# Patient Record
Sex: FEMALE | Race: WHITE | Employment: UNEMPLOYED | URBAN - NONMETROPOLITAN AREA
[De-identification: names, ages, dates, MRNs, and addresses within clinical notes are randomized per-mention and may not be internally consistent; named-entity substitution may affect disease eponyms.]

---
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
## 2023-09-24 ENCOUNTER — HOSPITAL ENCOUNTER (EMERGENCY)
Age: 18
Discharge: HOME | End: 2023-09-24
Attending: STUDENT IN AN ORGANIZED HEALTH CARE EDUCATION/TRAINING PROGRAM
Payer: COMMERCIAL

## 2023-09-24 VITALS
SYSTOLIC BLOOD PRESSURE: 137 MMHG | OXYGEN SATURATION: 100 % | RESPIRATION RATE: 23 BRPM | HEIGHT: 63 IN | DIASTOLIC BLOOD PRESSURE: 53 MMHG | TEMPERATURE: 98.3 F | HEART RATE: 114 BPM

## 2023-09-24 DIAGNOSIS — R11.2 NAUSEA AND VOMITING, UNSPECIFIED VOMITING TYPE: Primary | ICD-10-CM

## 2023-09-24 DIAGNOSIS — R10.13 ABDOMINAL PAIN, EPIGASTRIC: ICD-10-CM

## 2023-09-24 LAB
ALBUMIN SERPL BCG-MCNC: 4.4 G/DL (ref 3.5–5.1)
ALP SERPL-CCNC: 102 U/L (ref 38–126)
ALT SERPL W/O P-5'-P-CCNC: 13 U/L (ref 11–66)
ANION GAP SERPL CALC-SCNC: 11 MEQ/L (ref 8–16)
AST SERPL-CCNC: 15 U/L (ref 5–40)
B-HCG SERPL QL: NEGATIVE
BASOPHILS ABSOLUTE: 0 THOU/MM3 (ref 0–0.1)
BASOPHILS NFR BLD AUTO: 0.3 %
BILIRUB SERPL-MCNC: 0.3 MG/DL (ref 0.3–1.2)
BILIRUB UR QL STRIP.AUTO: NEGATIVE
BUN SERPL-MCNC: 8 MG/DL (ref 7–22)
CALCIUM SERPL-MCNC: 9.2 MG/DL (ref 8.5–10.5)
CHARACTER UR: CLEAR
CHLORIDE SERPL-SCNC: 103 MEQ/L (ref 98–111)
CO2 SERPL-SCNC: 25 MEQ/L (ref 23–33)
COLOR: YELLOW
CREAT SERPL-MCNC: 0.6 MG/DL (ref 0.4–1.2)
DEPRECATED RDW RBC AUTO: 40.4 FL (ref 35–45)
EOSINOPHIL NFR BLD AUTO: 0.5 %
EOSINOPHILS ABSOLUTE: 0.1 THOU/MM3 (ref 0–0.4)
ERYTHROCYTE [DISTWIDTH] IN BLOOD BY AUTOMATED COUNT: 13.3 % (ref 11.5–14.5)
FLUAV RNA RESP QL NAA+PROBE: NOT DETECTED
FLUBV RNA RESP QL NAA+PROBE: NOT DETECTED
GFR SERPL CREATININE-BSD FRML MDRD: > 60 ML/MIN/1.73M2
GLUCOSE SERPL-MCNC: 96 MG/DL (ref 70–108)
GLUCOSE UR QL STRIP.AUTO: NEGATIVE MG/DL
HCT VFR BLD AUTO: 44.4 % (ref 37–47)
HGB BLD-MCNC: 13.9 GM/DL (ref 12–16)
HGB UR QL STRIP.AUTO: NEGATIVE
IMM GRANULOCYTES # BLD AUTO: 0.05 THOU/MM3 (ref 0–0.07)
IMM GRANULOCYTES NFR BLD AUTO: 0.4 %
KETONES UR QL STRIP.AUTO: NEGATIVE
LACTIC ACID, SEPSIS: 1.8 MMOL/L (ref 0.5–1.9)
LIPASE SERPL-CCNC: 19 U/L (ref 5.6–51.3)
LYMPHOCYTES ABSOLUTE: 0.8 THOU/MM3 (ref 1–4.8)
LYMPHOCYTES NFR BLD AUTO: 5.9 %
MCH RBC QN AUTO: 26.2 PG (ref 26–33)
MCHC RBC AUTO-ENTMCNC: 31.3 GM/DL (ref 32.2–35.5)
MCV RBC AUTO: 83.6 FL (ref 81–99)
MONOCYTES ABSOLUTE: 0.9 THOU/MM3 (ref 0.4–1.3)
MONOCYTES NFR BLD AUTO: 6.4 %
NEUTROPHILS NFR BLD AUTO: 86.5 %
NITRITE UR QL STRIP: NEGATIVE
NRBC BLD AUTO-RTO: 0 /100 WBC
OSMOLALITY SERPL CALC.SUM OF ELEC: 275.7 MOSMOL/KG (ref 275–300)
PH UR STRIP.AUTO: 5.5 [PH] (ref 5–9)
PLATELET # BLD AUTO: 275 THOU/MM3 (ref 130–400)
PMV BLD AUTO: 10.6 FL (ref 9.4–12.4)
POTASSIUM SERPL-SCNC: 3.6 MEQ/L (ref 3.5–5.2)
PROT SERPL-MCNC: 6.7 G/DL (ref 6.1–8)
PROT UR STRIP.AUTO-MCNC: NEGATIVE MG/DL
RBC # BLD AUTO: 5.31 MILL/MM3 (ref 4.2–5.4)
SARS-COV-2 RNA RESP QL NAA+PROBE: NOT DETECTED
SEGMENTED NEUTROPHILS ABSOLUTE COUNT: 11.6 THOU/MM3 (ref 1.8–7.7)
SODIUM SERPL-SCNC: 139 MEQ/L (ref 135–145)
SP GR UR REFRACT.AUTO: 1.02 (ref 1–1.03)
UROBILINOGEN, URINE: 0.2 EU/DL (ref 0–1)
WBC # BLD AUTO: 13.4 THOU/MM3 (ref 4.8–10.8)
WBC #/AREA URNS HPF: NEGATIVE /[HPF]

## 2023-09-24 PROCEDURE — 87636 SARSCOV2 & INF A&B AMP PRB: CPT

## 2023-09-24 PROCEDURE — 83690 ASSAY OF LIPASE: CPT

## 2023-09-24 PROCEDURE — 36415 COLL VENOUS BLD VENIPUNCTURE: CPT

## 2023-09-24 PROCEDURE — 81003 URINALYSIS AUTO W/O SCOPE: CPT

## 2023-09-24 PROCEDURE — 99284 EMERGENCY DEPT VISIT MOD MDM: CPT

## 2023-09-24 PROCEDURE — 96374 THER/PROPH/DIAG INJ IV PUSH: CPT

## 2023-09-24 PROCEDURE — 6370000000 HC RX 637 (ALT 250 FOR IP): Performed by: STUDENT IN AN ORGANIZED HEALTH CARE EDUCATION/TRAINING PROGRAM

## 2023-09-24 PROCEDURE — 6360000002 HC RX W HCPCS: Performed by: STUDENT IN AN ORGANIZED HEALTH CARE EDUCATION/TRAINING PROGRAM

## 2023-09-24 PROCEDURE — 85025 COMPLETE CBC W/AUTO DIFF WBC: CPT

## 2023-09-24 PROCEDURE — 80053 COMPREHEN METABOLIC PANEL: CPT

## 2023-09-24 PROCEDURE — 2580000003 HC RX 258: Performed by: STUDENT IN AN ORGANIZED HEALTH CARE EDUCATION/TRAINING PROGRAM

## 2023-09-24 PROCEDURE — 83605 ASSAY OF LACTIC ACID: CPT

## 2023-09-24 PROCEDURE — 84703 CHORIONIC GONADOTROPIN ASSAY: CPT

## 2023-09-24 PROCEDURE — 96375 TX/PRO/DX INJ NEW DRUG ADDON: CPT

## 2023-09-24 RX ORDER — ACETAMINOPHEN 500 MG
1000 TABLET ORAL ONCE
Status: COMPLETED | OUTPATIENT
Start: 2023-09-24 | End: 2023-09-24

## 2023-09-24 RX ORDER — ONDANSETRON 4 MG/1
4 TABLET, ORALLY DISINTEGRATING ORAL 3 TIMES DAILY PRN
Qty: 9 TABLET | Refills: 0 | Status: SHIPPED | OUTPATIENT
Start: 2023-09-24 | End: 2023-10-03

## 2023-09-24 RX ORDER — MORPHINE SULFATE 2 MG/ML
2 INJECTION, SOLUTION INTRAMUSCULAR; INTRAVENOUS ONCE
Status: COMPLETED | OUTPATIENT
Start: 2023-09-24 | End: 2023-09-24

## 2023-09-24 RX ORDER — ONDANSETRON 2 MG/ML
4 INJECTION INTRAMUSCULAR; INTRAVENOUS ONCE
Status: COMPLETED | OUTPATIENT
Start: 2023-09-24 | End: 2023-09-24

## 2023-09-24 RX ORDER — SODIUM CHLORIDE, SODIUM LACTATE, POTASSIUM CHLORIDE, AND CALCIUM CHLORIDE .6; .31; .03; .02 G/100ML; G/100ML; G/100ML; G/100ML
1000 INJECTION, SOLUTION INTRAVENOUS ONCE
Status: COMPLETED | OUTPATIENT
Start: 2023-09-24 | End: 2023-09-24

## 2023-09-24 RX ORDER — 0.9 % SODIUM CHLORIDE 0.9 %
1000 INTRAVENOUS SOLUTION INTRAVENOUS ONCE
Status: COMPLETED | OUTPATIENT
Start: 2023-09-24 | End: 2023-09-24

## 2023-09-24 RX ADMIN — MORPHINE SULFATE 2 MG: 2 INJECTION, SOLUTION INTRAMUSCULAR; INTRAVENOUS at 01:26

## 2023-09-24 RX ADMIN — SODIUM CHLORIDE 1000 ML: 9 INJECTION, SOLUTION INTRAVENOUS at 01:30

## 2023-09-24 RX ADMIN — SODIUM CHLORIDE, POTASSIUM CHLORIDE, SODIUM LACTATE AND CALCIUM CHLORIDE 1000 ML: 600; 310; 30; 20 INJECTION, SOLUTION INTRAVENOUS at 03:34

## 2023-09-24 RX ADMIN — ONDANSETRON 4 MG: 2 INJECTION INTRAMUSCULAR; INTRAVENOUS at 01:25

## 2023-09-24 RX ADMIN — ACETAMINOPHEN 1000 MG: 500 TABLET ORAL at 03:04

## 2023-09-24 ASSESSMENT — PAIN SCALES - GENERAL
PAINLEVEL_OUTOF10: 7
PAINLEVEL_OUTOF10: 6
PAINLEVEL_OUTOF10: 7

## 2023-09-24 ASSESSMENT — PAIN DESCRIPTION - LOCATION
LOCATION: ABDOMEN

## 2023-09-24 ASSESSMENT — PAIN - FUNCTIONAL ASSESSMENT: PAIN_FUNCTIONAL_ASSESSMENT: 0-10

## 2023-09-24 NOTE — ED NOTES
Patient resting in bed. Respirations easy and unlabored. No distress noted. Call light within reach.       Gautam Shankar, JOSE  09/24/23 [id number]

## 2023-09-24 NOTE — ED NOTES
Urine sample collected and sent to lab at this time. Patient resting in bed. Respirations easy and unlabored. No distress noted. Call light within reach.       Gautam Shankar, JOSE  09/24/23 [id number]

## 2023-09-24 NOTE — ED NOTES
Patient resting in bed. Respirations easy and unlabored. No distress noted. Call light within reach.       Richard Karimi RN  09/24/23 [id number]

## 2023-09-24 NOTE — DISCHARGE INSTRUCTIONS
You are seen tonight for abdominal pain, nausea, vomiting. Your lab work was reassuring you did not need imaging at this time. Call family medicine clinic to establish primary care. Take an instrument as needed for nausea. If your symptoms are worse or other new concerns come please correct the emergency room ED for reevaluation.

## 2023-09-24 NOTE — ED TRIAGE NOTES
Patient presents to ED with chief complaint of abdominal pain and emesis that started Friday. Patient rates pain 7/10 and states she thinks she might have food poisoning due to eating bad meatballs. Patient resting in bed. Respirations easy and unlabored. No distress noted. Call light within reach.

## 2024-02-19 ENCOUNTER — OFFICE VISIT (OUTPATIENT)
Dept: FAMILY MEDICINE CLINIC | Age: 19
End: 2024-02-19
Payer: COMMERCIAL

## 2024-02-19 VITALS
RESPIRATION RATE: 21 BRPM | BODY MASS INDEX: 43.27 KG/M2 | HEART RATE: 88 BPM | HEIGHT: 63 IN | SYSTOLIC BLOOD PRESSURE: 142 MMHG | TEMPERATURE: 98.3 F | OXYGEN SATURATION: 99 % | DIASTOLIC BLOOD PRESSURE: 108 MMHG | WEIGHT: 244.2 LBS

## 2024-02-19 DIAGNOSIS — E66.01 MORBID OBESITY (HCC): Primary | ICD-10-CM

## 2024-02-19 DIAGNOSIS — R03.0 ELEVATED BLOOD PRESSURE READING: ICD-10-CM

## 2024-02-19 DIAGNOSIS — R00.0 TACHYCARDIA: ICD-10-CM

## 2024-02-19 DIAGNOSIS — Z11.59 NEED FOR HEPATITIS C SCREENING TEST: ICD-10-CM

## 2024-02-19 DIAGNOSIS — Z11.4 ENCOUNTER FOR SCREENING FOR HIV: ICD-10-CM

## 2024-02-19 PROCEDURE — G8484 FLU IMMUNIZE NO ADMIN: HCPCS

## 2024-02-19 PROCEDURE — G8417 CALC BMI ABV UP PARAM F/U: HCPCS

## 2024-02-19 PROCEDURE — 99204 OFFICE O/P NEW MOD 45 MIN: CPT

## 2024-02-19 PROCEDURE — 1036F TOBACCO NON-USER: CPT

## 2024-02-19 PROCEDURE — G8427 DOCREV CUR MEDS BY ELIG CLIN: HCPCS

## 2024-02-19 RX ORDER — BLOOD PRESSURE TEST KIT
1 KIT MISCELLANEOUS ONCE
Qty: 1 KIT | Refills: 0 | Status: SHIPPED | OUTPATIENT
Start: 2024-02-19 | End: 2024-02-19

## 2024-02-19 SDOH — ECONOMIC STABILITY: FOOD INSECURITY: WITHIN THE PAST 12 MONTHS, THE FOOD YOU BOUGHT JUST DIDN'T LAST AND YOU DIDN'T HAVE MONEY TO GET MORE.: NEVER TRUE

## 2024-02-19 SDOH — ECONOMIC STABILITY: HOUSING INSECURITY
IN THE LAST 12 MONTHS, WAS THERE A TIME WHEN YOU DID NOT HAVE A STEADY PLACE TO SLEEP OR SLEPT IN A SHELTER (INCLUDING NOW)?: NO

## 2024-02-19 SDOH — ECONOMIC STABILITY: FOOD INSECURITY: WITHIN THE PAST 12 MONTHS, YOU WORRIED THAT YOUR FOOD WOULD RUN OUT BEFORE YOU GOT MONEY TO BUY MORE.: NEVER TRUE

## 2024-02-19 SDOH — ECONOMIC STABILITY: INCOME INSECURITY: HOW HARD IS IT FOR YOU TO PAY FOR THE VERY BASICS LIKE FOOD, HOUSING, MEDICAL CARE, AND HEATING?: NOT HARD AT ALL

## 2024-02-19 ASSESSMENT — ENCOUNTER SYMPTOMS
COUGH: 0
CHEST TIGHTNESS: 0
ABDOMINAL PAIN: 0
SHORTNESS OF BREATH: 0
SINUS PAIN: 0
BACK PAIN: 0
SINUS PRESSURE: 0
CHOKING: 0
DIARRHEA: 0
SORE THROAT: 0

## 2024-02-19 ASSESSMENT — PATIENT HEALTH QUESTIONNAIRE - PHQ9
SUM OF ALL RESPONSES TO PHQ QUESTIONS 1-9: 1
SUM OF ALL RESPONSES TO PHQ9 QUESTIONS 1 & 2: 1
2. FEELING DOWN, DEPRESSED OR HOPELESS: 0
1. LITTLE INTEREST OR PLEASURE IN DOING THINGS: 1
SUM OF ALL RESPONSES TO PHQ QUESTIONS 1-9: 1

## 2024-02-19 NOTE — PROGRESS NOTES
S: 19 y.o. female with   Chief Complaint   Patient presents with    New Patient     Lost about 50lbs while pregnant, recently gained it all back. Jan 2023 gave birth.       HPI: please see resident note for HPI and ROS.    BP Readings from Last 3 Encounters:   02/19/24 (!) 142/108   09/24/23 (!) 137/53   04/02/22 138/88     Wt Readings from Last 3 Encounters:   02/19/24 110.8 kg (244 lb 3.2 oz)       O: VS:  height is 1.6 m (5' 3\") and weight is 110.8 kg (244 lb 3.2 oz). Her oral temperature is 98.3 °F (36.8 °C). Her blood pressure is 142/108 (abnormal) and her pulse is 88. Her respiration is 21 and oxygen saturation is 99%.   AAO/NAD, appropriate affect for mood  CV:  RRR, no murmur  Resp: CTAB     Diagnosis Orders   1. Morbid obesity (HCC)  Lipid Panel    CBC with Auto Differential    Comprehensive Metabolic Panel, Fasting    Hemoglobin A1C    TSH With Reflex Ft4    [facility] - Dietitian      2. Elevated blood pressure reading  CBC with Auto Differential    Comprehensive Metabolic Panel, Fasting    TSH With Reflex Ft4    Blood Pressure KIT      3. Tachycardia        4. Encounter for screening for HIV  HIV Screen      5. Need for hepatitis C screening test  Hepatitis C Antibody          Plan:  Please refer to resident note for full plan.    19 year old female presents to the office to establish care.    Patient has a history of tachycardia although not tachycardic today on exam.  Patient states she has been told this by other providers but nothing is ever done about it.  We will plan to obtain fasting blood work and follow-up for review.  No obvious concerns on exam today.  Monitor pulse at home.    Obesity: Chronic, uncontrolled.  BMI today 43.26.  Will plan to follow-up with dietitian to assist with lifestyle changes.  Will also plan to get blood work and see if patient qualifies for medication assisted therapy on follow-up.    Elevated blood pressure reading.  Patient elevated blood 
mass.      Tenderness: There is no abdominal tenderness.      Hernia: No hernia is present.   Musculoskeletal:         General: Normal range of motion.   Skin:     General: Skin is warm and dry.      Capillary Refill: Capillary refill takes less than 2 seconds.   Neurological:      General: No focal deficit present.      Mental Status: She is alert and oriented to person, place, and time. Mental status is at baseline.   Psychiatric:         Mood and Affect: Mood normal.         Behavior: Behavior normal.         Thought Content: Thought content normal.         There is no immunization history on file for this patient.    Health Maintenance Due   Topic Date Due    Hepatitis B vaccine (1 of 3 - 3-dose series) Never done    COVID-19 Vaccine (1) Never done    Varicella vaccine (1 of 2 - 2-dose childhood series) Never done    HPV vaccine (1 - 2-dose series) Never done    HIV screen  Never done    Chlamydia/GC screen  Never done    Hepatitis C screen  Never done    Flu vaccine (1) Never done    Pap smear  Never done       Food Insecurity: No Food Insecurity (2/19/2024)    Hunger Vital Sign     Worried About Running Out of Food in the Last Year: Never true     Ran Out of Food in the Last Year: Never true       Assessment / Plan:   1. Elevated blood pressure reading  Elevated BP, will order blood pressure cuff, check once daily, bring BP cuff at next visit to see if it is calibrated, DASH diet, aerobic exercise daily 30 minutes.    - CBC with Auto Differential; Future  - Comprehensive Metabolic Panel, Fasting; Future  - TSH With Reflex Ft4; Future  - Blood Pressure KIT; 1 Units by Does not apply route once for 1 dose  Dispense: 1 kit; Refill: 0    2. Tachycardia  History of tachycardia during office visits, patient is asymptomatic, not tachycardic during office visit, monitor with pulse oximetry at home, will obtain a CBC, CMP, TSH.    3. Morbid obesity (HCC)  Morbid obesity, BMI 43.26, encourage healthy lifestyle

## 2024-02-22 ENCOUNTER — NURSE ONLY (OUTPATIENT)
Dept: LAB | Age: 19
End: 2024-02-22

## 2024-02-22 DIAGNOSIS — E66.01 MORBID OBESITY (HCC): ICD-10-CM

## 2024-02-22 DIAGNOSIS — R03.0 ELEVATED BLOOD PRESSURE READING: ICD-10-CM

## 2024-02-22 DIAGNOSIS — Z11.59 NEED FOR HEPATITIS C SCREENING TEST: ICD-10-CM

## 2024-02-22 DIAGNOSIS — Z11.4 ENCOUNTER FOR SCREENING FOR HIV: ICD-10-CM

## 2024-02-22 LAB
ALBUMIN SERPL BCG-MCNC: 4.7 G/DL (ref 3.5–5.1)
ALP SERPL-CCNC: 105 U/L (ref 38–126)
ALT SERPL W/O P-5'-P-CCNC: 13 U/L (ref 11–66)
ANION GAP SERPL CALC-SCNC: 16 MEQ/L (ref 8–16)
AST SERPL-CCNC: 16 U/L (ref 5–40)
BASOPHILS ABSOLUTE: 0 THOU/MM3 (ref 0–0.1)
BASOPHILS NFR BLD AUTO: 0.3 %
BILIRUB SERPL-MCNC: 0.4 MG/DL (ref 0.3–1.2)
BUN SERPL-MCNC: 14 MG/DL (ref 7–22)
CALCIUM SERPL-MCNC: 9.9 MG/DL (ref 8.5–10.5)
CHLORIDE SERPL-SCNC: 101 MEQ/L (ref 98–111)
CHOLEST SERPL-MCNC: 176 MG/DL (ref 100–199)
CO2 SERPL-SCNC: 23 MEQ/L (ref 23–33)
CREAT SERPL-MCNC: 0.6 MG/DL (ref 0.4–1.2)
DEPRECATED MEAN GLUCOSE BLD GHB EST-ACNC: 99 MG/DL (ref 70–126)
DEPRECATED RDW RBC AUTO: 39 FL (ref 35–45)
EOSINOPHIL NFR BLD AUTO: 0.9 %
EOSINOPHILS ABSOLUTE: 0.1 THOU/MM3 (ref 0–0.4)
ERYTHROCYTE [DISTWIDTH] IN BLOOD BY AUTOMATED COUNT: 13.2 % (ref 11.5–14.5)
GFR SERPL CREATININE-BSD FRML MDRD: > 60 ML/MIN/1.73M2
GLUCOSE FASTING: 89 MG/DL (ref 70–108)
HBA1C MFR BLD HPLC: 5.3 % (ref 4.4–6.4)
HCT VFR BLD AUTO: 40.3 % (ref 37–47)
HCV IGG SERPL QL IA: NEGATIVE
HDLC SERPL-MCNC: 36 MG/DL
HGB BLD-MCNC: 12.6 GM/DL (ref 12–16)
IMM GRANULOCYTES # BLD AUTO: 0.02 THOU/MM3 (ref 0–0.07)
IMM GRANULOCYTES NFR BLD AUTO: 0.2 %
LDLC SERPL CALC-MCNC: 124 MG/DL
LYMPHOCYTES ABSOLUTE: 2.7 THOU/MM3 (ref 1–4.8)
LYMPHOCYTES NFR BLD AUTO: 31.9 %
MCH RBC QN AUTO: 25.6 PG (ref 26–33)
MCHC RBC AUTO-ENTMCNC: 31.3 GM/DL (ref 32.2–35.5)
MCV RBC AUTO: 81.7 FL (ref 81–99)
MONOCYTES ABSOLUTE: 0.6 THOU/MM3 (ref 0.4–1.3)
MONOCYTES NFR BLD AUTO: 6.4 %
NEUTROPHILS NFR BLD AUTO: 60.3 %
NRBC BLD AUTO-RTO: 0 /100 WBC
PLATELET # BLD AUTO: 318 THOU/MM3 (ref 130–400)
PMV BLD AUTO: 11 FL (ref 9.4–12.4)
POTASSIUM SERPL-SCNC: 3.8 MEQ/L (ref 3.5–5.2)
PROT SERPL-MCNC: 7.8 G/DL (ref 6.1–8)
RBC # BLD AUTO: 4.93 MILL/MM3 (ref 4.2–5.4)
SEGMENTED NEUTROPHILS ABSOLUTE COUNT: 5.2 THOU/MM3 (ref 1.8–7.7)
SODIUM SERPL-SCNC: 140 MEQ/L (ref 135–145)
TRIGL SERPL-MCNC: 78 MG/DL (ref 0–199)
TSH SERPL DL<=0.005 MIU/L-ACNC: 1.59 UIU/ML (ref 0.4–4.2)
WBC # BLD AUTO: 8.6 THOU/MM3 (ref 4.8–10.8)

## 2024-02-23 ENCOUNTER — TELEPHONE (OUTPATIENT)
Dept: FAMILY MEDICINE CLINIC | Age: 19
End: 2024-02-23

## 2024-02-23 LAB — HIV 1+2 AB+HIV1 P24 AG SERPL QL IA: NORMAL

## 2024-02-23 NOTE — TELEPHONE ENCOUNTER
----- Message from Devaughn Barrera MD sent at 2/23/2024  7:50 AM EST -----  Decrease HDL cholesterol, increase fiber intake, increase aerobic exercise.  All other labs look normal.

## 2025-02-03 ENCOUNTER — OFFICE VISIT (OUTPATIENT)
Dept: FAMILY MEDICINE CLINIC | Age: 20
End: 2025-02-03
Payer: COMMERCIAL

## 2025-02-03 VITALS
RESPIRATION RATE: 16 BRPM | TEMPERATURE: 98.9 F | WEIGHT: 250.4 LBS | BODY MASS INDEX: 44.37 KG/M2 | HEIGHT: 63 IN | SYSTOLIC BLOOD PRESSURE: 134 MMHG | DIASTOLIC BLOOD PRESSURE: 84 MMHG | HEART RATE: 104 BPM

## 2025-02-03 DIAGNOSIS — R00.0 TACHYCARDIA: ICD-10-CM

## 2025-02-03 DIAGNOSIS — E66.01 CLASS 3 SEVERE OBESITY DUE TO EXCESS CALORIES WITHOUT SERIOUS COMORBIDITY WITH BODY MASS INDEX (BMI) OF 40.0 TO 44.9 IN ADULT: Primary | ICD-10-CM

## 2025-02-03 DIAGNOSIS — E78.00 ELEVATED LDL CHOLESTEROL LEVEL: ICD-10-CM

## 2025-02-03 DIAGNOSIS — E66.813 CLASS 3 SEVERE OBESITY DUE TO EXCESS CALORIES WITHOUT SERIOUS COMORBIDITY WITH BODY MASS INDEX (BMI) OF 40.0 TO 44.9 IN ADULT: Primary | ICD-10-CM

## 2025-02-03 PROCEDURE — G8417 CALC BMI ABV UP PARAM F/U: HCPCS | Performed by: STUDENT IN AN ORGANIZED HEALTH CARE EDUCATION/TRAINING PROGRAM

## 2025-02-03 PROCEDURE — 1036F TOBACCO NON-USER: CPT | Performed by: STUDENT IN AN ORGANIZED HEALTH CARE EDUCATION/TRAINING PROGRAM

## 2025-02-03 PROCEDURE — G2211 COMPLEX E/M VISIT ADD ON: HCPCS | Performed by: STUDENT IN AN ORGANIZED HEALTH CARE EDUCATION/TRAINING PROGRAM

## 2025-02-03 PROCEDURE — 99214 OFFICE O/P EST MOD 30 MIN: CPT | Performed by: STUDENT IN AN ORGANIZED HEALTH CARE EDUCATION/TRAINING PROGRAM

## 2025-02-03 PROCEDURE — G8427 DOCREV CUR MEDS BY ELIG CLIN: HCPCS | Performed by: STUDENT IN AN ORGANIZED HEALTH CARE EDUCATION/TRAINING PROGRAM

## 2025-02-03 SDOH — ECONOMIC STABILITY: FOOD INSECURITY: WITHIN THE PAST 12 MONTHS, YOU WORRIED THAT YOUR FOOD WOULD RUN OUT BEFORE YOU GOT MONEY TO BUY MORE.: NEVER TRUE

## 2025-02-03 SDOH — ECONOMIC STABILITY: FOOD INSECURITY: WITHIN THE PAST 12 MONTHS, THE FOOD YOU BOUGHT JUST DIDN'T LAST AND YOU DIDN'T HAVE MONEY TO GET MORE.: NEVER TRUE

## 2025-02-03 ASSESSMENT — ENCOUNTER SYMPTOMS
WHEEZING: 0
DIARRHEA: 0
NAUSEA: 0
ABDOMINAL PAIN: 0
VOMITING: 0
BACK PAIN: 0
COUGH: 0
SHORTNESS OF BREATH: 0
CONSTIPATION: 0

## 2025-02-03 ASSESSMENT — PATIENT HEALTH QUESTIONNAIRE - PHQ9
2. FEELING DOWN, DEPRESSED OR HOPELESS: NOT AT ALL
1. LITTLE INTEREST OR PLEASURE IN DOING THINGS: NOT AT ALL
SUM OF ALL RESPONSES TO PHQ QUESTIONS 1-9: 0
SUM OF ALL RESPONSES TO PHQ9 QUESTIONS 1 & 2: 0
SUM OF ALL RESPONSES TO PHQ QUESTIONS 1-9: 0

## 2025-02-03 NOTE — PROGRESS NOTES
Leslie Robertson is a 19 y.o. female who presents today for:  Chief Complaint   Patient presents with    New Patient     Get established, previous PCP listed as Devaughn Barrera MD at the residency clinic.       Weight Loss     Would like to discuss weight loss options.          HPI:     History of Present Illness  The patient presents to establish care and discuss weight loss options.    She has been experiencing difficulty in losing weight, which has begun to impact her mental health. She expresses concern about her ability to care for her 2-year-old son due to her weight issues. Despite her efforts, including the use of a walking pad, she has not seen any progress and is considering discontinuing its use. She reports no unhealthy eating habits or consumption of junk food, and primarily drinks water. She has previously used an rayshawn to monitor her caloric intake but has not done so recently. She has attempted strength training with 10-pound weights but has not been consistent. She admits to occasional binge eating and cravings. She has never used weight loss medications before but is open to trying them. She has expressed interest in phentermine as a potential weight loss aid.She did not have gestational diabetes during her pregnancy.     She has a history of elevated heart rate, which was first identified during a plasma donation. She reports no symptoms of lightheadedness or dizziness.    Supplemental Information  She has had her tonsils removed. She has not had a Pap smear since her pregnancy and does not currently see an OB-GYN.    SOCIAL HISTORY  She has a 2-year-old son.    FAMILY HISTORY  Her mother has diabetes and other related health problems.        Objective:     Vitals:    02/03/25 1401   BP: 134/84   Site: Right Upper Arm   Position: Sitting   Pulse: (!) 104   Resp: 16   Temp: 98.9 °F (37.2 °C)   TempSrc: Oral   Weight: 113.6 kg (250 lb 6.4 oz)   Height: 1.6 m (5' 2.99\")       Wt Readings from

## 2025-02-03 NOTE — PROGRESS NOTES
Health Maintenance Due   Topic Date Due    Varicella vaccine (1 of 2 - 13+ 2-dose series) Never done    HPV vaccine (1 - 3-dose series) Never done    Chlamydia/GC screen  Never done    Hepatitis B vaccine (1 of 3 - 19+ 3-dose series) Never done    Pap smear  Never done    Flu vaccine (1) Never done    COVID-19 Vaccine (1 - 2023-24 season) Never done    Depression Screen  Done Today

## 2025-02-23 ENCOUNTER — HOSPITAL ENCOUNTER (EMERGENCY)
Age: 20
Discharge: HOME | End: 2025-02-23
Payer: COMMERCIAL

## 2025-02-23 VITALS
SYSTOLIC BLOOD PRESSURE: 117 MMHG | DIASTOLIC BLOOD PRESSURE: 64 MMHG | OXYGEN SATURATION: 97 % | BODY MASS INDEX: 43.41 KG/M2 | WEIGHT: 245 LBS | TEMPERATURE: 99.4 F | HEIGHT: 63 IN | RESPIRATION RATE: 20 BRPM | HEART RATE: 127 BPM

## 2025-02-23 DIAGNOSIS — J11.1 INFLUENZA-LIKE ILLNESS: Primary | ICD-10-CM

## 2025-02-23 LAB
FLUAV AG SPEC QL: NEGATIVE
FLUBV AG SPEC QL: NEGATIVE
S PYO AG THROAT QL: NEGATIVE

## 2025-02-23 PROCEDURE — 99213 OFFICE O/P EST LOW 20 MIN: CPT

## 2025-02-23 PROCEDURE — 87804 INFLUENZA ASSAY W/OPTIC: CPT

## 2025-02-23 PROCEDURE — 87651 STREP A DNA AMP PROBE: CPT

## 2025-02-23 ASSESSMENT — PAIN DESCRIPTION - LOCATION: LOCATION: THROAT

## 2025-02-23 ASSESSMENT — PAIN DESCRIPTION - DESCRIPTORS: DESCRIPTORS: SORE

## 2025-02-23 ASSESSMENT — PAIN - FUNCTIONAL ASSESSMENT: PAIN_FUNCTIONAL_ASSESSMENT: 0-10

## 2025-02-23 ASSESSMENT — PAIN SCALES - GENERAL: PAINLEVEL_OUTOF10: 6

## 2025-02-23 NOTE — DISCHARGE INSTRUCTIONS
Strep negative.   Flu negative.   Warm salt water gargles, throat lozenges for sore throat.   Increase water intake, frequent hand washing.  OTC Mucinex, Zyrtec for congestion.  OTC Robitussin for cough.   Tylenol / Ibuprofen as needed for fever and or pain.  Follow up with PCP in 3-5 days if no improvement or sooner with worsening symptoms.

## 2025-02-23 NOTE — ED PROVIDER NOTES
[facility] URGENT CARE  Urgent Care Encounter       CHIEF COMPLAINT       Chief Complaint   Patient presents with    Cough    Chest Congestion    Nasal Congestion    Pharyngitis       Nurses Notes reviewed and I agree except as noted in the HPI.  HISTORY OF PRESENT ILLNESS   Leslie Robertson is a 20 y.o. female who presents with concerns of cough, congestion, and sore throat. Reports symptoms started Friday, cough and sore throat is most bothersome. Reports no use of medication for symptom management.     HPI    REVIEW OF SYSTEMS     Review of Systems   Constitutional:  Positive for fatigue and fever.   HENT:  Positive for congestion and sore throat.    Respiratory:  Positive for cough.    All other systems reviewed and are negative.      PAST MEDICAL HISTORY   History reviewed. No pertinent past medical history.    SURGICALHISTORY     Patient  has a past surgical history that includes Tonsillectomy.    CURRENT MEDICATIONS       There are no discharge medications for this patient.      ALLERGIES     Patient is is allergic to sulfa antibiotics.    Patients   There is no immunization history on file for this patient.    FAMILY HISTORY     Patient's family history includes Depression in her mother; Diabetes in her mother; Heart Attack in her maternal grandfather.    SOCIAL HISTORY     Patient  reports that she has never smoked. She has never used smokeless tobacco. She reports that she does not currently use alcohol. She reports that she does not currently use drugs.    PHYSICAL EXAM     ED TRIAGE VITALS  BP: 117/64, Temp: 99.4 °F (37.4 °C), Pulse: (!) 127, Respirations: 20, SpO2: 97 %,Estimated body mass index is 43.4 kg/m² as calculated from the following:    Height as of this encounter: 1.6 m (5' 3\").    Weight as of this encounter: 111.1 kg (245 lb).,Patient's last menstrual period was 02/01/2025.    Physical Exam  Vitals and nursing note reviewed.   Constitutional:       General: She is not in acute distress.      Weight: 111.1 kg (245 lb)   Height: 1.6 m (5' 3\")       Medications - No data to display         PROCEDURES:  None    FINAL IMPRESSION      1. Influenza-like illness          DISPOSITION/ PLAN     Patient seen and evaluated for the above symptoms.  A rapid influenza and strep obtained, both resulted negative.  We did discuss that this is a viral infection and will resolve on its own.  We did discuss symptom management with Zyrtec, Flonase, and Mucinex D.  Instructed use over-the-counter Tylenol and Motrin for pain or fever.  Instructed to push oral fluids.  Instructed to isolate until symptoms improve along with resolution of fever for 24 hours without medications.  Instructed to complete good hand hygiene.  Instructed to follow-up with PCP in 3 to 5 days with new worsening symptoms.  Instructed to present to the emergent department for severe dyspnea, fever uncontrolled with acetaminophen, or other symptoms deemed emergent.  Patient is agreeable with the above plan and denies questions or concerns at this time.      PATIENT REFERRED TO:  Felicitas Novak MD  [address] / [facility] [address]      DISCHARGE MEDICATIONS:  There are no discharge medications for this patient.      There are no discharge medications for this patient.      There are no discharge medications for this patient.      URMILA Tapia CNP    (Please note that portions of this note were completed with a voice recognition program. Efforts were made to edit the dictations but occasionally words are mis-transcribed.)            Eboni Kruse APRN - CNP  02/23/25 [id number]

## 2025-02-23 NOTE — ED NOTES
Pt with complaints of a cough, nasal congestion, chest congestion and sore throat that started Friday. States she has tried many different decongestants with no relief. States cough is productive with yellow and green mucus.     Teddy Poole, LAYNE  02/23/25 1545

## 2025-03-03 ENCOUNTER — LAB (OUTPATIENT)
Dept: LAB | Age: 20
End: 2025-03-03

## 2025-03-03 DIAGNOSIS — R00.0 TACHYCARDIA: ICD-10-CM

## 2025-03-03 DIAGNOSIS — E78.00 ELEVATED LDL CHOLESTEROL LEVEL: ICD-10-CM

## 2025-03-03 DIAGNOSIS — E66.813 CLASS 3 SEVERE OBESITY DUE TO EXCESS CALORIES WITHOUT SERIOUS COMORBIDITY WITH BODY MASS INDEX (BMI) OF 40.0 TO 44.9 IN ADULT: ICD-10-CM

## 2025-03-03 DIAGNOSIS — E66.01 CLASS 3 SEVERE OBESITY DUE TO EXCESS CALORIES WITHOUT SERIOUS COMORBIDITY WITH BODY MASS INDEX (BMI) OF 40.0 TO 44.9 IN ADULT: ICD-10-CM

## 2025-03-03 LAB
ALBUMIN SERPL BCG-MCNC: 4.3 G/DL (ref 3.4–4.9)
ALP SERPL-CCNC: 98 U/L (ref 35–104)
ALT SERPL W/O P-5'-P-CCNC: 27 U/L (ref 10–35)
ANION GAP SERPL CALC-SCNC: 10 MEQ/L (ref 8–16)
AST SERPL-CCNC: 26 U/L (ref 10–35)
BASOPHILS ABSOLUTE: 0 THOU/MM3 (ref 0–0.1)
BASOPHILS NFR BLD AUTO: 0.4 %
BILIRUB SERPL-MCNC: 0.2 MG/DL (ref 0.3–1.2)
BUN SERPL-MCNC: 14 MG/DL (ref 8–23)
CALCIUM SERPL-MCNC: 9.4 MG/DL (ref 8.6–10)
CHLORIDE SERPL-SCNC: 103 MEQ/L (ref 98–111)
CHOLEST SERPL-MCNC: 172 MG/DL (ref 100–199)
CO2 SERPL-SCNC: 27 MEQ/L (ref 22–29)
CREAT SERPL-MCNC: 0.6 MG/DL (ref 0.5–0.9)
DEPRECATED MEAN GLUCOSE BLD GHB EST-ACNC: 105 MG/DL (ref 70–126)
DEPRECATED RDW RBC AUTO: 38.5 FL (ref 35–45)
EOSINOPHIL NFR BLD AUTO: 1 %
EOSINOPHILS ABSOLUTE: 0.1 THOU/MM3 (ref 0–0.4)
ERYTHROCYTE [DISTWIDTH] IN BLOOD BY AUTOMATED COUNT: 12.9 % (ref 11.5–14.5)
GFR SERPL CREATININE-BSD FRML MDRD: > 90 ML/MIN/1.73M2
GLUCOSE SERPL-MCNC: 85 MG/DL (ref 74–109)
HBA1C MFR BLD HPLC: 5.5 % (ref 4–6)
HCT VFR BLD AUTO: 40.3 % (ref 37–47)
HDLC SERPL-MCNC: 38 MG/DL
HGB BLD-MCNC: 12.6 GM/DL (ref 12–16)
IMM GRANULOCYTES # BLD AUTO: 0.03 THOU/MM3 (ref 0–0.07)
IMM GRANULOCYTES NFR BLD AUTO: 0.4 %
LDLC SERPL CALC-MCNC: 110 MG/DL
LYMPHOCYTES ABSOLUTE: 1.8 THOU/MM3 (ref 1–4.8)
LYMPHOCYTES NFR BLD AUTO: 23.3 %
MCH RBC QN AUTO: 25.8 PG (ref 26–33)
MCHC RBC AUTO-ENTMCNC: 31.3 GM/DL (ref 32.2–35.5)
MCV RBC AUTO: 82.6 FL (ref 81–99)
MONOCYTES ABSOLUTE: 0.4 THOU/MM3 (ref 0.4–1.3)
MONOCYTES NFR BLD AUTO: 5.4 %
NEUTROPHILS ABSOLUTE: 5.4 THOU/MM3 (ref 1.8–7.7)
NEUTROPHILS NFR BLD AUTO: 69.5 %
NRBC BLD AUTO-RTO: 0 /100 WBC
PLATELET # BLD AUTO: 303 THOU/MM3 (ref 130–400)
PMV BLD AUTO: 10.6 FL (ref 9.4–12.4)
POTASSIUM SERPL-SCNC: 4.3 MEQ/L (ref 3.5–5.2)
PROT SERPL-MCNC: 7.1 G/DL (ref 6.4–8.3)
RBC # BLD AUTO: 4.88 MILL/MM3 (ref 4.2–5.4)
SODIUM SERPL-SCNC: 140 MEQ/L (ref 135–145)
TRIGL SERPL-MCNC: 120 MG/DL (ref 0–199)
TSH SERPL DL<=0.05 MIU/L-ACNC: 0.87 UIU/ML (ref 0.27–4.2)
WBC # BLD AUTO: 7.8 THOU/MM3 (ref 4.8–10.8)

## 2025-03-05 ENCOUNTER — OFFICE VISIT (OUTPATIENT)
Dept: FAMILY MEDICINE CLINIC | Age: 20
End: 2025-03-05
Payer: COMMERCIAL

## 2025-03-05 VITALS
HEART RATE: 104 BPM | RESPIRATION RATE: 16 BRPM | SYSTOLIC BLOOD PRESSURE: 114 MMHG | DIASTOLIC BLOOD PRESSURE: 76 MMHG | BODY MASS INDEX: 44.6 KG/M2 | WEIGHT: 251.8 LBS | TEMPERATURE: 98.5 F

## 2025-03-05 DIAGNOSIS — E66.01 CLASS 3 SEVERE OBESITY DUE TO EXCESS CALORIES WITHOUT SERIOUS COMORBIDITY WITH BODY MASS INDEX (BMI) OF 40.0 TO 44.9 IN ADULT: Primary | ICD-10-CM

## 2025-03-05 DIAGNOSIS — E66.813 CLASS 3 SEVERE OBESITY DUE TO EXCESS CALORIES WITHOUT SERIOUS COMORBIDITY WITH BODY MASS INDEX (BMI) OF 40.0 TO 44.9 IN ADULT: Primary | ICD-10-CM

## 2025-03-05 PROCEDURE — 99214 OFFICE O/P EST MOD 30 MIN: CPT | Performed by: STUDENT IN AN ORGANIZED HEALTH CARE EDUCATION/TRAINING PROGRAM

## 2025-03-05 PROCEDURE — G2211 COMPLEX E/M VISIT ADD ON: HCPCS | Performed by: STUDENT IN AN ORGANIZED HEALTH CARE EDUCATION/TRAINING PROGRAM

## 2025-03-05 PROCEDURE — G8427 DOCREV CUR MEDS BY ELIG CLIN: HCPCS | Performed by: STUDENT IN AN ORGANIZED HEALTH CARE EDUCATION/TRAINING PROGRAM

## 2025-03-05 PROCEDURE — G8417 CALC BMI ABV UP PARAM F/U: HCPCS | Performed by: STUDENT IN AN ORGANIZED HEALTH CARE EDUCATION/TRAINING PROGRAM

## 2025-03-05 PROCEDURE — 1036F TOBACCO NON-USER: CPT | Performed by: STUDENT IN AN ORGANIZED HEALTH CARE EDUCATION/TRAINING PROGRAM

## 2025-03-05 RX ORDER — NALTREXONE HYDROCHLORIDE 50 MG/1
TABLET, FILM COATED ORAL
Qty: 30 TABLET | Refills: 1 | Status: SHIPPED | OUTPATIENT
Start: 2025-03-05

## 2025-03-05 RX ORDER — BUPROPION HYDROCHLORIDE 150 MG/1
150 TABLET ORAL EVERY MORNING
Qty: 30 TABLET | Refills: 3 | Status: SHIPPED | OUTPATIENT
Start: 2025-03-05

## 2025-03-05 ASSESSMENT — ENCOUNTER SYMPTOMS
SHORTNESS OF BREATH: 0
NAUSEA: 0
ABDOMINAL PAIN: 0
WHEEZING: 0
COUGH: 0
CONSTIPATION: 0
DIARRHEA: 0
BACK PAIN: 0
VOMITING: 0

## 2025-03-05 NOTE — PROGRESS NOTES
Health Maintenance Due   Topic Date Due    Varicella vaccine (2 of 2 - 2-dose childhood series) 02/16/2007    Polio vaccine (3 of 3 - 4-dose series) 02/18/2009    HPV vaccine (1 - 3-dose series) Never done    Chlamydia/GC screen  Never done    Hepatitis B vaccine (1 of 3 - 19+ 3-dose series) Never done    Pap smear  Never done    Flu vaccine (1) 08/01/2024    COVID-19 Vaccine (1 - 2024-25 season) Never done      
Dispense:  30 tablet     Refill:  3    naltrexone (DEPADE) 50 MG tablet     Sig: Take 1/2 tablet by mouth daily x1 week, then increase to 1/2 tablet twice daily and continue     Dispense:  30 tablet     Refill:  1       Future Appointments   Date Time Provider Department Center   4/2/2025  3:20 PM Felicitas Novak MD [facility]       Patient given educational materials - see patient instructions.  Discussed use, benefit, and sideeffects of prescribed medications.  All patient questions answered.  Pt voiced understanding. Reviewed health maintenance.  Instructed to continue current medications, diet and exercise.  Patient agreed with treatment plan.Follow up as directed.     The patient (or guardian, if applicable) and other individuals in attendance with the patient were advised that Artificial Intelligence will be utilized during this visit to record, process the conversation to generate a clinical note, and support improvement of the AI technology. The patient (or guardian, if applicable) and other individuals in attendance at the appointment consented to the use of AI, including the recording.          Electronically signed by Felicitas Novak MD on 3/5/2025 at 4:29 PM

## 2025-04-02 ENCOUNTER — RESULTS FOLLOW-UP (OUTPATIENT)
Dept: FAMILY MEDICINE CLINIC | Age: 20
End: 2025-04-02

## 2025-04-02 ENCOUNTER — OFFICE VISIT (OUTPATIENT)
Dept: FAMILY MEDICINE CLINIC | Age: 20
End: 2025-04-02
Payer: COMMERCIAL

## 2025-04-02 VITALS
DIASTOLIC BLOOD PRESSURE: 80 MMHG | HEART RATE: 104 BPM | BODY MASS INDEX: 44.83 KG/M2 | WEIGHT: 253 LBS | HEIGHT: 63 IN | SYSTOLIC BLOOD PRESSURE: 136 MMHG | TEMPERATURE: 98.6 F | RESPIRATION RATE: 16 BRPM

## 2025-04-02 DIAGNOSIS — E66.813 CLASS 3 SEVERE OBESITY DUE TO EXCESS CALORIES WITHOUT SERIOUS COMORBIDITY WITH BODY MASS INDEX (BMI) OF 40.0 TO 44.9 IN ADULT: ICD-10-CM

## 2025-04-02 DIAGNOSIS — R00.0 TACHYCARDIA: Primary | ICD-10-CM

## 2025-04-02 DIAGNOSIS — F32.0 CURRENT MILD EPISODE OF MAJOR DEPRESSIVE DISORDER WITHOUT PRIOR EPISODE: ICD-10-CM

## 2025-04-02 DIAGNOSIS — F41.9 ANXIETY: ICD-10-CM

## 2025-04-02 DIAGNOSIS — E66.01 CLASS 3 SEVERE OBESITY DUE TO EXCESS CALORIES WITHOUT SERIOUS COMORBIDITY WITH BODY MASS INDEX (BMI) OF 40.0 TO 44.9 IN ADULT: ICD-10-CM

## 2025-04-02 PROCEDURE — 93000 ELECTROCARDIOGRAM COMPLETE: CPT | Performed by: STUDENT IN AN ORGANIZED HEALTH CARE EDUCATION/TRAINING PROGRAM

## 2025-04-02 PROCEDURE — G8417 CALC BMI ABV UP PARAM F/U: HCPCS | Performed by: STUDENT IN AN ORGANIZED HEALTH CARE EDUCATION/TRAINING PROGRAM

## 2025-04-02 PROCEDURE — 99214 OFFICE O/P EST MOD 30 MIN: CPT | Performed by: STUDENT IN AN ORGANIZED HEALTH CARE EDUCATION/TRAINING PROGRAM

## 2025-04-02 PROCEDURE — G2211 COMPLEX E/M VISIT ADD ON: HCPCS | Performed by: STUDENT IN AN ORGANIZED HEALTH CARE EDUCATION/TRAINING PROGRAM

## 2025-04-02 PROCEDURE — 1036F TOBACCO NON-USER: CPT | Performed by: STUDENT IN AN ORGANIZED HEALTH CARE EDUCATION/TRAINING PROGRAM

## 2025-04-02 PROCEDURE — G8427 DOCREV CUR MEDS BY ELIG CLIN: HCPCS | Performed by: STUDENT IN AN ORGANIZED HEALTH CARE EDUCATION/TRAINING PROGRAM

## 2025-04-02 ASSESSMENT — ENCOUNTER SYMPTOMS
SHORTNESS OF BREATH: 0
WHEEZING: 0

## 2025-04-02 NOTE — PROGRESS NOTES
Leslie Robertson is a 20 y.o. female who presents today for:  Chief Complaint   Patient presents with    Follow-up     1 month follow-up for weight management.   Patient stated she stopped taking medication because it was making her too tired, she found it difficult to get out of bed.          HPI:     History of Present Illness  The patient presents for a follow-up visit of weight management.    Patient was started on Wellbutrin and Naltrexone last visit. However, medication was discontinued due to severe fatigue. The medications were taken in the morning, and significant tiredness persisted the following day. The regimen lasted approximately 1.5 weeks. Fatigue improved after stopping the medication, but residual tiredness is still experienced, which she attributes to mood. She has not been following any diet or exercise recently due to fatigue and lack of motivation.    Persistent depression and lack of motivation are reported, ongoing even prior to the initiation of the medication. She has had intermittent depression for several years, and does also feel as though she struggles with worry and some anxiety. She has never been on medications for this previously. No suicidal ideation is reported.    A consistently elevated heart rate is noted, without perceived palpitations. HR is consistently 100s-120s in the office. An isolated episode of chest pain occurred while in a car approximately 2 months ago. Energy drinks and excessive caffeine are not consumed. She is curious if her heart rate will decrease with weight loss. No known history of heart problems is endorsed.        Objective:     Vitals:    04/02/25 1520   BP: 136/80   BP Site: Left Upper Arm   Patient Position: Sitting   Pulse: (!) 104   Resp: 16   Temp: 98.6 °F (37 °C)   TempSrc: Oral   Weight: 114.8 kg (253 lb)   Height: 1.6 m (5' 2.99\")       Wt Readings from Last 3 Encounters:   04/02/25 114.8 kg (253 lb)   03/05/25 114.2 kg (251 lb 12.8 oz)

## 2025-04-15 ENCOUNTER — HOSPITAL ENCOUNTER (OUTPATIENT)
Age: 20
Discharge: HOME | End: 2025-04-17
Attending: STUDENT IN AN ORGANIZED HEALTH CARE EDUCATION/TRAINING PROGRAM
Payer: COMMERCIAL

## 2025-04-15 DIAGNOSIS — R00.0 TACHYCARDIA: ICD-10-CM

## 2025-04-15 PROCEDURE — 93246 EXT ECG>7D<15D RECORDING: CPT

## 2025-04-24 ENCOUNTER — HOSPITAL ENCOUNTER (OUTPATIENT)
Age: 20
Discharge: HOME | End: 2025-04-26
Attending: STUDENT IN AN ORGANIZED HEALTH CARE EDUCATION/TRAINING PROGRAM
Payer: COMMERCIAL

## 2025-04-24 PROCEDURE — 93246 EXT ECG>7D<15D RECORDING: CPT

## 2025-05-22 NOTE — TELEPHONE ENCOUNTER
That would depend how patient's heart rate is running. If she is still having elevated heart rate, then would not be a candidate for Adipex. If she would like to discuss next steps, would recommend appointment in office. Thanks!  
no

## 2025-07-24 ENCOUNTER — LAB (OUTPATIENT)
Dept: LAB | Age: 20
End: 2025-07-24

## 2025-07-25 LAB
SOURCE: NORMAL
SOURCE: NORMAL

## 2025-07-28 LAB
SOURCE: NORMAL
TRICHOMONAS VAGINALIS, MOLECULAR: NEGATIVE

## 2025-07-29 LAB
C. TRACHOMATIS DNA,THIN PREP: NEGATIVE
N. GONORRHOEAE DNA, THIN PREP: NEGATIVE
SOURCE: NORMAL

## 2025-08-11 LAB — CYTOLOGY THIN PREP PAP: NORMAL

## 2025-08-12 ENCOUNTER — LAB (OUTPATIENT)
Dept: LAB | Age: 20
End: 2025-08-12

## 2025-08-12 LAB
DEPRECATED RDW RBC AUTO: 40.9 FL (ref 35–45)
ERYTHROCYTE [DISTWIDTH] IN BLOOD BY AUTOMATED COUNT: 13.9 % (ref 11.5–14.5)
GLUCOSE SERPL-MCNC: 111 MG/DL (ref 69–140)
HBV SURFACE AG SERPL QL IA: NONREACTIVE
HCT VFR BLD AUTO: 36.4 % (ref 37–47)
HCV IGG SERPL QL IA: NONREACTIVE
HGB BLD-MCNC: 11.9 GM/DL (ref 12–16)
IAT IGG-SP REAG SERPL QL: NORMAL
MCH RBC QN AUTO: 26.7 PG (ref 26–33)
MCHC RBC AUTO-ENTMCNC: 32.7 GM/DL (ref 32.2–35.5)
MCV RBC AUTO: 81.6 FL (ref 81–99)
PLATELET # BLD AUTO: 218 THOU/MM3 (ref 130–400)
PMV BLD AUTO: 11.7 FL (ref 9.4–12.4)
RBC # BLD AUTO: 4.46 MILL/MM3 (ref 4.2–5.4)
RUBV IGG SERPL IA-ACNC: 26.6 IU/ML
WBC # BLD AUTO: 6.6 THOU/MM3 (ref 4.8–10.8)

## 2025-08-13 LAB
BACTERIA UR CULT: ABNORMAL
HIV 1+2 AB+HIV1 P24 AG SERPL QL IA: NORMAL
ORGANISM: ABNORMAL
RPR SER QL: NONREACTIVE